# Patient Record
Sex: FEMALE | Race: WHITE | NOT HISPANIC OR LATINO | URBAN - METROPOLITAN AREA
[De-identification: names, ages, dates, MRNs, and addresses within clinical notes are randomized per-mention and may not be internally consistent; named-entity substitution may affect disease eponyms.]

---

## 2017-01-24 ENCOUNTER — OUTPATIENT (OUTPATIENT)
Dept: OUTPATIENT SERVICES | Facility: HOSPITAL | Age: 62
LOS: 1 days | End: 2017-01-24
Payer: COMMERCIAL

## 2017-01-24 DIAGNOSIS — R00.2 PALPITATIONS: ICD-10-CM

## 2017-01-24 DIAGNOSIS — I49.3 VENTRICULAR PREMATURE DEPOLARIZATION: ICD-10-CM

## 2017-01-24 PROCEDURE — 93017 CV STRESS TEST TRACING ONLY: CPT

## 2017-01-24 PROCEDURE — 93018 CV STRESS TEST I&R ONLY: CPT

## 2017-01-24 PROCEDURE — 93016 CV STRESS TEST SUPVJ ONLY: CPT

## 2020-10-30 ENCOUNTER — TRANSCRIPTION ENCOUNTER (OUTPATIENT)
Age: 65
End: 2020-10-30

## 2022-01-14 PROBLEM — Z00.00 ENCOUNTER FOR PREVENTIVE HEALTH EXAMINATION: Status: ACTIVE | Noted: 2022-01-14

## 2022-02-11 ENCOUNTER — APPOINTMENT (OUTPATIENT)
Dept: PULMONOLOGY | Facility: CLINIC | Age: 67
End: 2022-02-11

## 2022-05-03 ENCOUNTER — NON-APPOINTMENT (OUTPATIENT)
Age: 67
End: 2022-05-03

## 2022-05-04 ENCOUNTER — APPOINTMENT (OUTPATIENT)
Dept: OTOLARYNGOLOGY | Facility: CLINIC | Age: 67
End: 2022-05-04
Payer: MEDICARE

## 2022-05-04 ENCOUNTER — NON-APPOINTMENT (OUTPATIENT)
Age: 67
End: 2022-05-04

## 2022-05-04 VITALS
TEMPERATURE: 98 F | SYSTOLIC BLOOD PRESSURE: 124 MMHG | HEIGHT: 64 IN | OXYGEN SATURATION: 98 % | BODY MASS INDEX: 25.44 KG/M2 | DIASTOLIC BLOOD PRESSURE: 82 MMHG | HEART RATE: 72 BPM | WEIGHT: 149 LBS

## 2022-05-04 DIAGNOSIS — Z78.9 OTHER SPECIFIED HEALTH STATUS: ICD-10-CM

## 2022-05-04 DIAGNOSIS — Z86.007 PERSONAL HISTORY OF IN-SITU NEOPLASM OF SKIN: ICD-10-CM

## 2022-05-04 DIAGNOSIS — Z87.410 PERSONAL HISTORY OF CERVICAL DYSPLASIA: ICD-10-CM

## 2022-05-04 DIAGNOSIS — Z86.39 PERSONAL HISTORY OF OTHER ENDOCRINE, NUTRITIONAL AND METABOLIC DISEASE: ICD-10-CM

## 2022-05-04 DIAGNOSIS — H93.292 OTHER ABNORMAL AUDITORY PERCEPTIONS, LEFT EAR: ICD-10-CM

## 2022-05-04 DIAGNOSIS — H83.3X2 NOISE EFFECTS ON LEFT INNER EAR: ICD-10-CM

## 2022-05-04 PROCEDURE — 92557 COMPREHENSIVE HEARING TEST: CPT

## 2022-05-04 PROCEDURE — 92567 TYMPANOMETRY: CPT

## 2022-05-04 PROCEDURE — 99203 OFFICE O/P NEW LOW 30 MIN: CPT

## 2022-05-04 RX ORDER — LEVOTHYROXINE SODIUM 0.09 MG/1
88 TABLET ORAL
Refills: 0 | Status: ACTIVE | COMMUNITY

## 2022-05-04 RX ORDER — THYROID, PORCINE 90 MG/1
TABLET ORAL
Refills: 0 | Status: ACTIVE | COMMUNITY
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
## 2022-05-05 RX ORDER — PREDNISONE 10 MG/1
10 TABLET ORAL
Qty: 12 | Refills: 0 | Status: ACTIVE | COMMUNITY
Start: 2022-05-05 | End: 1900-01-01

## 2024-01-19 ENCOUNTER — NON-APPOINTMENT (OUTPATIENT)
Age: 69
End: 2024-01-19

## 2024-01-23 ENCOUNTER — NON-APPOINTMENT (OUTPATIENT)
Age: 69
End: 2024-01-23

## 2024-02-02 ENCOUNTER — APPOINTMENT (OUTPATIENT)
Dept: OTOLARYNGOLOGY | Facility: CLINIC | Age: 69
End: 2024-02-02
Payer: MEDICARE

## 2024-02-02 DIAGNOSIS — H90.3 SENSORINEURAL HEARING LOSS, BILATERAL: ICD-10-CM

## 2024-02-02 DIAGNOSIS — H93.13 TINNITUS, BILATERAL: ICD-10-CM

## 2024-02-02 DIAGNOSIS — K13.70 UNSPECIFIED LESIONS OF ORAL MUCOSA: ICD-10-CM

## 2024-02-02 PROCEDURE — 92567 TYMPANOMETRY: CPT

## 2024-02-02 PROCEDURE — 99213 OFFICE O/P EST LOW 20 MIN: CPT

## 2024-02-02 PROCEDURE — 92557 COMPREHENSIVE HEARING TEST: CPT

## 2024-02-02 NOTE — ASSESSMENT
[FreeTextEntry1] : She has bilateral sensorineural hearing loss.  She had tinnitus which developed after she flew with nasal congestion.  Her ears were normal on exam.  We reviewed her audiogram.  She had normal middle ear pressures  She has a history of recurrent oral cavity lesions.  She does suffer from oral herpes.  There were no suspicious masses.  She had whitish lesions on the inner surface of the upper and lower lips  Plan -Findings and management options were discussed with the patient. -Good ear hygiene reviewed -Monitor hearing- -hearing aid evaluation recommended -She was given literature regarding management of tinnitus -I recommended that she see an oral pathologist who specializes in oral cavity lesions. I gave her the information for Dr. Devin Ponce -She declined nasal endoscopy and flexible laryngoscopy as she has no nasal or throat symptoms at this time -If she is doing well, I will see her back in 1 year to check her ears.   -I have asked her to check in with me after she sees oral pathologist -She should call and return earlier if she has any problems or worsening symptoms

## 2024-02-02 NOTE — HISTORY OF PRESENT ILLNESS
[de-identified] : BOLIVAR STERLING is a 68 year old patient With a history of bilateral sensorineural hearing loss here for tinnitus and oral cavity lesions.  She said that she flew about 3.5 weeks ago when she had nasal congestion.  She had ear pressure and tinnitus.  She has no otalgia, otorrhea, dizziness, or change in her hearing.  4 weeks ago, she did have mild COVID.  She had throat symptoms but those have improved.  She has no throat pain, dysphagia, or voice change.  She has had recurrent oral cavity lesions.  She does have a history of herpes.  She has been followed by her dentist as well as other physicians.  She tried multiple medications including nystatin, antivirals, and lysine.  She has sores on the upper and lower lips now that are resolving.  No history of recurrent ear infections or prior otologic surgery She does not use hearing aids

## 2024-02-02 NOTE — PHYSICAL EXAM
[TextEntry] : PHYSICAL EXAM  General: The patient was alert, oriented and in no distress. Voice was clear.  Face: The patient had no facial asymmetry or mass. The skin was unremarkable.  Ears: Hearing normal to conversational voice External ears were normal without deformity. Ear canals were clear. No cerumen or inflammation Tympanic membranes were intact and normal. No perforation or effusion. mobile  Nose:  The external nose had no significant deformity.     On anterior rhinoscopy, the nasal mucosa was clear.   The anterior septum was grossly midline. There were no visualized polyps, purulence  or masses.   Oral cavity: Oral and base of tongue- clear and without mass. Gingival and buccal mucosa- moist.  There were whitish lesions (which appeared like a line) on the inner surface of the upper and lower lip.  There were no ulcerations.  There was mild erythema. Palate- the palate moved well. There was no cleft palate. There appeared to be good salivary flow.   Oral cavity/oropharynx- no pus, erythema or mass  Neck:  The neck was symmetrical. The parotid and submandibular glands were normal without masses. The trachea was midline and there was no unusual crepitus. Thyroid was smooth and nontender and no masses were palpated. No masses  Lymphatics: Cervical adenopathy- none.    AUDIOGRAM- test ordered and the results reviewed and discussed with the patient.  It was compared to her prior audiogram Hearing-bilateral sensorineural hearing loss-stable Word recognition-right ear-80%.  Left ear-96 Tympanograms- AD- type A, AS- type A

## 2024-02-16 PROBLEM — H90.3 SENSORINEURAL HEARING LOSS (SNHL) OF BOTH EARS: Status: ACTIVE | Noted: 2024-02-02

## 2024-06-06 ENCOUNTER — APPOINTMENT (OUTPATIENT)
Dept: ORTHOPEDIC SURGERY | Facility: CLINIC | Age: 69
End: 2024-06-06
Payer: MEDICARE

## 2024-06-06 VITALS — RESPIRATION RATE: 14 BRPM | HEIGHT: 64 IN | WEIGHT: 149 LBS | BODY MASS INDEX: 25.44 KG/M2

## 2024-06-06 DIAGNOSIS — M65.342 TRIGGER FINGER, LEFT RING FINGER: ICD-10-CM

## 2024-06-06 DIAGNOSIS — M65.332 TRIGGER FINGER, LEFT MIDDLE FINGER: ICD-10-CM

## 2024-06-06 PROCEDURE — 99203 OFFICE O/P NEW LOW 30 MIN: CPT

## 2024-06-06 PROCEDURE — 73120 X-RAY EXAM OF HAND: CPT | Mod: 50

## 2024-06-06 NOTE — PHYSICAL EXAM
[de-identified] : Physical exam shows the patient to be alert and oriented x3, capable of ambulation. The patient is well-developed and well-nourished in no apparent respiratory distress. Majority of the skin is intact bilaterally in the upper extremities without lymphadenopathy at the elbows.  The wrists have a symmetric range of motion bilaterally. There is no tenderness over the scaphoid, scapholunate or lunotriquetral ligaments bilaterally. There is a negative Rico test bilaterally. There is negative tenderness over the radial ulnar joint or TFCC and no evidence of instability bilaterally. No tenderness over the pisotriquetral or hamate hook or CMC joint bilaterally. There is 5 over 5 strength of the wrists bilaterally.  There is no tenderness over the MP PIP or DIP joints and no evidence of instability of the flexion extensor mechanism is intact.  There is tenderness of the A1 pulley of the third and fourth digits bilaterally.  No evidence of locking upon compression  There is good capillary refill of the digits bilaterally.There is no masses or sensitivity over the median and ulnar nerves at the level of the wrist. There is a negative Tinel's and negative Phalen's sign bilaterally. The sensation is grossly intact bilaterally. [de-identified] : PA lateral of both hands shows degenerative changes sclerosis and osteophytes of the DIP joints bilaterally without evidence of soft tissue calcifications.  Radiocarpal midcarpal MP and PIP joints appear to be well-preserved.

## 2024-06-06 NOTE — CONSULT LETTER
[Dear  ___] : Dear  [unfilled], [Consult Letter:] : I had the pleasure of evaluating your patient, [unfilled]. [Please see my note below.] : Please see my note below. [Consult Closing:] : Thank you very much for allowing me to participate in the care of this patient.  If you have any questions, please do not hesitate to contact me. [Sincerely,] : Sincerely, [FreeTextEntry3] : Herrera Christian M.D., FAAOS Co-Director [facility]

## 2024-06-06 NOTE — ASSESSMENT
[FreeTextEntry1] : Patient has a left third and fourth greater than right third and fourth flexor tendinitis localized to the A1 pulley frequently referred to his trigger fingers without locking.  The risks, benefits, alternatives and associated differential diagnosis was discussed with the patient. Options ranged from conservative care, therapy to surgical intervention were reviewed and all questions answered. Risks included incomplete resolution of symptoms, worsening of symptoms recurrence, tissue loss, functional loss and other risks associated with treatment of this condition Patient appeared to have an excellent understanding of the risks as well as differential diagnosis associated with this condition.  A prescription for therapy was provided as well as a home program.  If symptoms continue and do not resolve she return to the office for reevaluation and may consider more aggressive forms of treatment such as injections.

## 2024-06-06 NOTE — HISTORY OF PRESENT ILLNESS
[Right] : right hand dominant [FreeTextEntry1] : Patient here for initial evaluation of bilateral hand cramping especially the left third and fourth.  Patient states it is worse in the morning and gets better throughout the day.  Patient has A1 pulley pain of the left third and fourth digits.  Patient denies any trauma to the hand.  Patient also denies any numbness in the hands.  Patient was worked up for rheumatoid which is negative.